# Patient Record
Sex: MALE | Race: WHITE | Employment: FULL TIME | URBAN - METROPOLITAN AREA
[De-identification: names, ages, dates, MRNs, and addresses within clinical notes are randomized per-mention and may not be internally consistent; named-entity substitution may affect disease eponyms.]

---

## 2020-05-24 ENCOUNTER — APPOINTMENT (OUTPATIENT)
Dept: CT IMAGING | Age: 35
End: 2020-05-24
Payer: COMMERCIAL

## 2020-05-24 ENCOUNTER — HOSPITAL ENCOUNTER (EMERGENCY)
Age: 35
Discharge: HOME | End: 2020-05-24
Attending: EMERGENCY MEDICINE
Payer: COMMERCIAL

## 2020-05-24 VITALS
TEMPERATURE: 97.5 F | BODY MASS INDEX: 38.57 KG/M2 | WEIGHT: 240 LBS | HEART RATE: 90 BPM | DIASTOLIC BLOOD PRESSURE: 80 MMHG | OXYGEN SATURATION: 96 % | RESPIRATION RATE: 16 BRPM | HEIGHT: 66 IN | SYSTOLIC BLOOD PRESSURE: 144 MMHG

## 2020-05-24 PROCEDURE — 99283 EMERGENCY DEPT VISIT LOW MDM: CPT

## 2020-05-24 PROCEDURE — 70450 CT HEAD/BRAIN W/O DYE: CPT

## 2020-05-24 PROCEDURE — 12011 RPR F/E/E/N/L/M 2.5 CM/<: CPT

## 2020-05-24 PROCEDURE — 70486 CT MAXILLOFACIAL W/O DYE: CPT

## 2020-05-24 RX ORDER — NAPROXEN 500 MG/1
500 TABLET ORAL 2 TIMES DAILY PRN
Qty: 14 TABLET | Refills: 0 | Status: SHIPPED | OUTPATIENT
Start: 2020-05-24 | End: 2020-05-31

## 2020-05-24 ASSESSMENT — PAIN SCALES - GENERAL: PAINLEVEL_OUTOF10: 2

## 2020-05-24 ASSESSMENT — PAIN DESCRIPTION - ORIENTATION: ORIENTATION: RIGHT

## 2020-05-24 ASSESSMENT — PAIN - FUNCTIONAL ASSESSMENT: PAIN_FUNCTIONAL_ASSESSMENT: ACTIVITIES ARE NOT PREVENTED

## 2020-05-24 ASSESSMENT — PAIN DESCRIPTION - PROGRESSION: CLINICAL_PROGRESSION: NOT CHANGED

## 2020-05-24 ASSESSMENT — PAIN DESCRIPTION - ONSET: ONSET: ON-GOING

## 2020-05-24 ASSESSMENT — PAIN DESCRIPTION - DESCRIPTORS: DESCRIPTORS: DISCOMFORT

## 2020-05-24 ASSESSMENT — PAIN DESCRIPTION - LOCATION: LOCATION: EYE

## 2020-05-24 ASSESSMENT — PAIN DESCRIPTION - PAIN TYPE: TYPE: ACUTE PAIN

## 2020-05-25 NOTE — ED NOTES
Awake and alert- HWANG  Semi fowlers in bed.   1/2 cm laceration over right eye     Jannette Solomon RN  05/24/20 0403

## 2020-05-25 NOTE — ED PROVIDER NOTES
HPI:  5/24/20, Time: 9:54 PM EDT        Iam Aaron is a 37 y.o. male presenting to the ED for head injury w/ laceration to R eyebrow, beginning 20 hours ago. The complaint has been intermittent, moderate in severity, and worsened by palpation and activity. States he was drinking last night and initially was not going to come in but because of family's urging he decided come in today 20 hours after the initial injury to have his wound sutured and also to be evaluated. He denies any severe pain only rates his pain at 2/10 severity. No other complaints at all reported no neck pain no chest pain no abdominal pain no change in bowel bladder patterns with no focal extremity weakness    Review of Systems:   Pertinent positives and negatives are stated within HPI, all other systems reviewed and are negative.      --------------------------------------------- PAST HISTORY ---------------------------------------------  Past Medical History:  has a past medical history of Shingles outbreak. Past Surgical History:  has a past surgical history that includes Tonsillectomy and Dental surgery. Social History:  reports that he has been smoking cigarettes. He has been smoking about 1.00 pack per day. He has never used smokeless tobacco. He reports current alcohol use. He reports that he does not use drugs. Family History: family history is not on file. The patients home medications have been reviewed. Allergies: Patient has no known allergies. -------------------------------------------------- RESULTS -------------------------------------------------  All laboratory and radiology results have been personally reviewed by myself   LABS:  No results found for this visit on 05/24/20. RADIOLOGY:  Interpreted by Radiologist.  [facility]   Final Result   No acute displaced fractures of the facial bones. CT HEAD WO CONTRAST   Final Result   : No indication for an acute intracranial process. ------------------------- NURSING NOTES AND VITALS REVIEWED ---------------------------  The nursing notes within the ED encounter and vital signs as below have been reviewed. BP (!) 144/80   Pulse 90   Temp 97.5 °F (36.4 °C) (Temporal)   Resp 16   Ht 5' 6\" (1.676 m)   Wt 240 lb (108.9 kg)   SpO2 96%   BMI 38.74 kg/m²   Oxygen Saturation Interpretation: Normal      ---------------------------------------------------PHYSICAL EXAM--------------------------------------      Constitutional/General: Alert and oriented x3, well appearing, non toxic in NAD  Head:  Ecchymosis is noted around the right eye with an avulsion laceration of the right eyebrow which measures 1 cm; No raccoon eye sign no valentin sign no hemotympanum  Eyes: PERRL, EOMI; no hyphema, no subconjunctival hemorrhage no hypopyon  Mouth: Oropharynx clear, handling secretions, no trismus  Neck: Supple, full ROM,   Pulmonary: Lungs clear to auscultation bilaterally, no wheezes, rales, or rhonchi. Not in respiratory distress  Cardiovascular:  Regular rate and rhythm, no murmurs, gallops, or rubs. 2+ distal pulses  Abdomen: Soft, non tender, non distended,   Extremities: Moves all extremities x 4. Warm and well perfused  Skin: warm and dry without rash  Neurologic: GCS 15, cranial nerves II through XII grossly intact no focal deficits. No meningeal signs  Psych: Normal Affect    ------------------------------ ED COURSE/MEDICAL DECISION MAKING----------------------  Medications - No data to display    ED COURSE:     Medical Decision Making:   Despite the patient's delayed presentation I did agree to provide loose primary closure because of the gaping wound. Did discuss the risk versus benefits of this with the patient    PROCEDURE NOTE  5/24/20         LACERATION REPAIR  Risks, benefits and alternatives (for applicable procedures below) described. Performed By: Ilene Aldrich MD.  Laceration #: 1. Location: R eyebrow  Length: 1 cm.   The